# Patient Record
Sex: MALE | Race: OTHER | HISPANIC OR LATINO | URBAN - METROPOLITAN AREA
[De-identification: names, ages, dates, MRNs, and addresses within clinical notes are randomized per-mention and may not be internally consistent; named-entity substitution may affect disease eponyms.]

---

## 2023-04-03 ENCOUNTER — EMERGENCY (EMERGENCY)
Facility: HOSPITAL | Age: 63
LOS: 1 days | Discharge: HOME | End: 2023-04-03
Attending: STUDENT IN AN ORGANIZED HEALTH CARE EDUCATION/TRAINING PROGRAM | Admitting: STUDENT IN AN ORGANIZED HEALTH CARE EDUCATION/TRAINING PROGRAM
Payer: MEDICAID

## 2023-04-03 ENCOUNTER — EMERGENCY (EMERGENCY)
Facility: HOSPITAL | Age: 63
LOS: 1 days | Discharge: ANOTHER HOSPITAL | End: 2023-04-03
Attending: EMERGENCY MEDICINE | Admitting: EMERGENCY MEDICINE
Payer: MEDICAID

## 2023-04-03 VITALS
TEMPERATURE: 98 F | RESPIRATION RATE: 18 BRPM | SYSTOLIC BLOOD PRESSURE: 120 MMHG | OXYGEN SATURATION: 99 % | HEART RATE: 56 BPM | DIASTOLIC BLOOD PRESSURE: 64 MMHG

## 2023-04-03 VITALS
HEIGHT: 67 IN | OXYGEN SATURATION: 95 % | WEIGHT: 220.02 LBS | SYSTOLIC BLOOD PRESSURE: 120 MMHG | TEMPERATURE: 99 F | DIASTOLIC BLOOD PRESSURE: 79 MMHG | HEART RATE: 80 BPM | RESPIRATION RATE: 18 BRPM

## 2023-04-03 VITALS
TEMPERATURE: 98 F | DIASTOLIC BLOOD PRESSURE: 66 MMHG | HEART RATE: 97 BPM | RESPIRATION RATE: 18 BRPM | SYSTOLIC BLOOD PRESSURE: 133 MMHG | HEIGHT: 67 IN | OXYGEN SATURATION: 100 %

## 2023-04-03 LAB — SARS-COV-2 RNA SPEC QL NAA+PROBE: SIGNIFICANT CHANGE UP

## 2023-04-03 PROCEDURE — 90715 TDAP VACCINE 7 YRS/> IM: CPT

## 2023-04-03 PROCEDURE — 99285 EMERGENCY DEPT VISIT HI MDM: CPT

## 2023-04-03 PROCEDURE — U0003: CPT

## 2023-04-03 PROCEDURE — 90471 IMMUNIZATION ADMIN: CPT

## 2023-04-03 PROCEDURE — 99284 EMERGENCY DEPT VISIT MOD MDM: CPT

## 2023-04-03 PROCEDURE — 70480 CT ORBIT/EAR/FOSSA W/O DYE: CPT | Mod: 26,MA

## 2023-04-03 PROCEDURE — 99284 EMERGENCY DEPT VISIT MOD MDM: CPT | Mod: 25

## 2023-04-03 PROCEDURE — 70480 CT ORBIT/EAR/FOSSA W/O DYE: CPT | Mod: MA

## 2023-04-03 PROCEDURE — U0005: CPT

## 2023-04-03 RX ORDER — OXYCODONE AND ACETAMINOPHEN 5; 325 MG/1; MG/1
1 TABLET ORAL ONCE
Refills: 0 | Status: DISCONTINUED | OUTPATIENT
Start: 2023-04-03 | End: 2023-04-03

## 2023-04-03 RX ORDER — ACETAMINOPHEN 500 MG
650 TABLET ORAL ONCE
Refills: 0 | Status: COMPLETED | OUTPATIENT
Start: 2023-04-03 | End: 2023-04-03

## 2023-04-03 RX ORDER — TETANUS TOXOID, REDUCED DIPHTHERIA TOXOID AND ACELLULAR PERTUSSIS VACCINE, ADSORBED 5; 2.5; 8; 8; 2.5 [IU]/.5ML; [IU]/.5ML; UG/.5ML; UG/.5ML; UG/.5ML
0.5 SUSPENSION INTRAMUSCULAR ONCE
Refills: 0 | Status: COMPLETED | OUTPATIENT
Start: 2023-04-03 | End: 2023-04-03

## 2023-04-03 RX ADMIN — Medication 650 MILLIGRAM(S): at 12:38

## 2023-04-03 RX ADMIN — OXYCODONE AND ACETAMINOPHEN 1 TABLET(S): 5; 325 TABLET ORAL at 14:56

## 2023-04-03 RX ADMIN — TETANUS TOXOID, REDUCED DIPHTHERIA TOXOID AND ACELLULAR PERTUSSIS VACCINE, ADSORBED 0.5 MILLILITER(S): 5; 2.5; 8; 8; 2.5 SUSPENSION INTRAMUSCULAR at 12:37

## 2023-04-03 NOTE — CONSULT NOTE ADULT - SUBJECTIVE AND OBJECTIVE BOX
[facility] DEPARTMENT OF OPHTHALMOLOGY - INITIAL ADULT CONSULT  ------------------------------------------------------------------------------------------------------------    HPI: 62-year-old male, history of CAD hypertension hyperlipidemia on Plavix prior cataract surgery, presents as a transfer from [place] with a chief complaint of traumatic injury to the left, patient reports he was using a nail gun today when shot a nail into a 2 x 4 with a wooden bolts that thereafter shattered and wood fragments flew into his eye causing bleeding from the eye, no other injuries no other complaints, can move everything and feel everything, no vision changes no photophobia, was sent to [facility] for operative evaluation, had a CT at outside hospital that did not show any retained foreign bodies or fragments, vision there was noted to be 20/20 bilateral OD OS OL uncorrected fluorescein uptake stain was negative.  Patient reports pain is controlled.      PAST MEDICAL & SURGICAL HISTORY:  Hypertension        Past Ocular History: None  Ophthalmic Medications: None    MEDICATIONS  (STANDING):    MEDICATIONS  (PRN):    Allergies & Intolerances:   No Known Allergies    Review of Systems:  Constitutional: No fever, chills  Eyes: No blurry vision, flashes, floaters, FBS, erythema, discharge, double vision, OU  Neuro: No tremors  Cardiovascular: No chest pain, palpitations  Respiratory: No SOB, no cough  GI: No nausea, vomiting, abdominal pain  : No dysuria  Skin: no rash  Psych: no depression  Endocrine: no polyuria, polydipsia  Heme/lymph: no swelling    VITALS: T(C): 36.7 (04-03-23 @ 16:48)  T(F): 98 (04-03-23 @ 16:48), Max: 98.6 (04-03-23 @ 12:02)  HR: 97 (04-03-23 @ 16:48) (80 - 97)  BP: 133/66 (04-03-23 @ 16:48) (120/79 - 133/66)  RR:  (18 - 18)  SpO2:  (95% - 100%)  Wt(kg): --  General: AAO x 3, appropriate mood and affect    Ophthalmology Exam:  Visual acuity (sc): 20/20 OU  Pupils: PERRL OU, no APD  Ttono: 15 OD, 13 OS  Extraocular movements (EOMs): Full OU, no pain, no diplopia  Confrontational Visual Field (CVF): Full OU  Color Plates: 12/12 OU    Pen Light Exam (PLE)  External: Flat OU  Lids/Lashes/Lacrimal Ducts: Flat OU    Sclera/Conjunctiva: W+Q OD, temporal TESSY OS, Piyush negative  Cornea: Cl OU  Anterior Chamber: D+F OU    Iris: Flat OU  Lens: PCIOL OU    Fundus Exam: dilated with 1% tropicamide and 2.5% phenylephrine  Approval obtained from primary team for dilation  Patient aware that pupils can remained dilated for at least 4-6 hours  Exam performed with 20D lens    Vitreous: wnl OU  Disc, cup/disc: sharp and pink, 0.4 OU  Macula: wnl OU  Vessels: wnl OU  Periphery: wnl OU    Labs/Imaging:        ACC: [id number] EXAM:  CT ORBITS   ORDERED BY: KASSI MONTANO     PROCEDURE DATE:  04/03/2023          INTERPRETATION:  HISTORY: Left orbital trauma. Evaluate for foreign body   or globe rupture..    COMPARISON: None.    TECHNIQUE: Noncontrast axial CT images of the orbits were acquired and   submitted for interpretation. Sagittal and coronal reformations are also   provided.    FINDINGS:    No radiopaque foreign body or fracture is identified.  The globes are   intact without retrobulbar hematoma. Bilateral cataract surgery. The   extraocular muscles and optic nerves are symmetric and normal in size.    Visualized parotid glands, infratemporal fossa, and parapharyngeal fat   are within normal limits.    Scattered ethmoid sinus mucosal thickening. Remaining visualized   paranasal sinuses are clear. No fluid level.    Calcified plaque along the intracranial carotid siphons.    The nasopharyngeal contours are within normal limits.    IMPRESSION:    No orbital foreign body. Intact globes.    --- End of Report ---            KINJAL MCNEILL MD; Attending Radiologist  This document has been electronically signed. Apr  3 2023  2:37PM

## 2023-04-03 NOTE — CONSULT NOTE ADULT - ASSESSMENT
Assessment and Recommendations:  62y male w/ pmhx/ochx of CAD hypertension hyperlipidemia on Plavix prior cataract surgery consulted for OS trauma after being struck with OS with a metallic nut while using a nail gun, found to have subconjunctival hemorrhage.     1. OS trauma  - Visual acuity (sc): 20/20 OU, Pupils: PERRL OU, no APD, Ttono: 15 OD, 13 OS, Extraocular movements (EOMs): Full OU, no pain, no diplopiaConfrontational Visual Field (CVF): Full OU, Color Plates: 12/12 OU  - TESSY OS, Piyush negative, corneas clear  - CT negative for IOFB, globe rupture  - Start preservative-free artificial tears QID OS.     Outpatient follow-up: Patient should follow-up with his/her ophthalmologist or with [facility] Department of Ophthalmology at the address below     [address] [place] [address]. [address]  [place] [address]  [phone]    Matilda LIVINGSTON Rai, MD  PGY-3, Ophthalmology  [phone]    Also available on [employer] Teams.

## 2023-04-03 NOTE — ED PROVIDER NOTE - OBJECTIVE STATEMENT
Shona GARCIA: 62-year-old male, history of CAD hypertension hyperlipidemia on Plavix prior cataract surgery, presents as a transfer from [place] with a chief complaint of traumatic injury to the left, patient reports he was using a nail gun today when shot a nail into a 2 x 4 with a wooden bolts that thereafter shattered and wood fragments flew into his eye causing bleeding from the eye, no other injuries no other complaints, can move everything and feel everything, no vision changes no photophobia, was sent to [facility] for operative evaluation, had a CT at outside hospital that did not show any retained foreign bodies or fragments, vision there was noted to be 20/20 bilateral OD OS OL uncorrected fluorescein uptake stain was negative.  Patient reports pain is controlled.

## 2023-04-03 NOTE — ED ADULT NURSE NOTE - OBJECTIVE STATEMENT
Pt was at work when a piece of wood went into his left eye.  Left eye noted with bleeding. Pt was at work when a nail that he was hammering slipped and went into his left eye.  Left eye noted with bleeding.

## 2023-04-03 NOTE — ED PROVIDER NOTE - NSFOLLOWUPINSTRUCTIONS_ED_ALL_ED_FT
Thank you for visiting our Emergency Department, it has been a pleasure taking part in your healthcare.  Please read and follow all of your discharge instructions. These instructions contain important information regarding your Emergency Department visit and future medical care.     Your discharge diagnosis is: eye injury  Please take all discharge medications as indicated below:  Take Motrin/Tylenol for pain as needed, please follow instructions on manufacturers label. If you have any questions please consult a pharmacist or your PMD.  Please follow up with your Primary Care Doctor (PMD) within x48 hours.  Bring and show your PMD all documents and results you were given during your ED visit.  If you do not have a primary care doctor please call [phone] to establish primary care.  Please follow up with Opthalmology within x48 hours.  A list of providers for follow up has been given to you.  A copy of resulted labs, imaging, and findings have been provided to you.   You can also access all of your results through the [facility] how [facility] [place] [facility].  If you have questions about your results, please call the Emergency Department.  During your visit and at time of discharge, you had a detailed discussion with your provider regarding your diagnosis, care management and discharge planning.  Topics that were discussed included but were not limited to: return precautions, follow up visits with existing or new providers, new prescriptions and/or medication changes, wound and/or splint/cast care, incidental laboratory/radiology findings, or other care   aspects specific to your diagnosis and treatment. You have been given the opportunity to have your questions answered. At this time you have been deemed stable and fit for discharge.  Return precautions to the Emergency Department include but are not limited to: unrelenting nausea, vomiting, fever, chills, chest pain, shortness of breath, dizziness, chest or abdominal pain, worsening back pain, syncope, blood in urine or stool, headache that doesn't resolve, numbness or tingling, loss of sensation, loss of motor function, or any other concerning symptoms.    Please bring all ED Documents you were given during your stay to your PMD.   They contain important information for you and your PMD, including incidental lab/radiology findings that your PMD should be aware of.

## 2023-04-03 NOTE — ED PROVIDER NOTE - ATTENDING APP SHARED VISIT CONTRIBUTION OF CARE
62-year-old male with history of hypertension, CAD x3 cardiac stents, on aspirin and Plavix, states that while at work he was using a nail gun to frame a window, and after discharging the nail a piece of wood shot into his left thigh causing pain and bleeding, patient states he feels pressure around the periorbital area, vision is intact, noted subconjunctival hemorrhage of left eye, PERRL, EOMI, tetracaine drops applied to left eye with fluorescein stain, no uptake on Woods lamp, will obtain CT orbits rule out foreign body and concern for ruptured globe will call ophthalmology, will update patient's tetanus and Tylenol for pain control, patient denies nausea or vomiting.

## 2023-04-03 NOTE — ED PROVIDER NOTE - PATIENT PORTAL LINK FT
You can access the FollowMyHealth Patient Portal offered by [facility] by registering at the following website: [email or web address]. By joining [employer]’s FollowMyHealth portal, you will also be able to view your health information using other applications (apps) compatible with our system.

## 2023-04-03 NOTE — ED ADULT NURSE REASSESSMENT NOTE - NS ED NURSE REASSESS COMMENT FT1
Patient awake and resting in chair; respirations even and unlabored, no signs/symptoms of acute distress. Patient denies dyspnea, shortness of breath, and chest pain. Patient endorses 8/10 pain to left eye; patient endorses improvement in condition, MD aware. Safety measures in place and call bell within reach.

## 2023-04-03 NOTE — ED PROVIDER NOTE - PHYSICAL EXAMINATION
Detail Level: Generalized
Detail Level: Zone
Shona GARCIA:  VITALS: Initial triage and subsequent vitals have been reviewed by me.  GEN APPEARANCE: WDWN, alert and cooperative, non-toxic appearing and in NAD  HEAD: Atraumatic, normocephalic   EYES: PERRLa, EOMI, vision grossly intact. Left subconjunctival hemorrhage pupil equal round reactive to light bilaterally EOM intact  EARS: Gross hearing intact.   NOSE: No nasal discharge, no external evidence of epistaxis.   NECK: Supple  CV: RRR, S1S2, no c/r/m/g. No cyanosis. Extremities warm, well perfused. Cap refill <2 seconds. No bruits.   LUNGS: CTAB. No wheezing. No rales. No rhonchi. No diminished breath sounds.   ABDOMEN: Soft, NTND. No guarding or rebound. No masses.   MSK/EXT: Spine appears normal, no spine point tenderness. No CVA ttp. Normal muscular development. Pelvis stable. No obvious joint or bony deformity, no peripheral edema.   NEURO: Alert, follows commands. Weight bearing normal. Speech normal. Sensation and motor normal x4 extremities.   SKIN: Normal color for race, warm, dry and intact. No evidence of rash.  PSYCH: Normal mood and affect.

## 2023-04-03 NOTE — ED ADULT TRIAGE NOTE - CHIEF COMPLAINT QUOTE
Pt reporting to the ED as a Transfer from [place] for Optho evaluation, pt doing yard work with nail gun and nail went into pt's left eye, has sub conjunctival hemorrhage. No pmh.

## 2023-04-03 NOTE — ED PROVIDER NOTE - OBJECTIVE STATEMENT
63 yo M PMHx HTN, CAD on aspirin and plavix presents to ED c/o left eye trauma x just prior to arrival. Pt states he was using a nail gun overhead while doing some wooden frame work, accidentally nailed into a metal bolt causing the nail to fly into his left eye. Pt c/o pain to L eye, bleeding and some blurred vision. Last tetanus unknown. 61 yo M PMHx HTN, HLD, CAD sp stents on aspirin and plavix presents to ED c/o left eye trauma x just prior to arrival. Pt states he was using a nail gun overhead while doing some wooden frame work, accidentally nailed into a metal bolt causing the nail to fly into his left eye. Pt c/o pain to L eye, bleeding and some blurred vision. Last tetanus unknown.

## 2023-04-03 NOTE — ED ADULT NURSE NOTE - CHIEF COMPLAINT QUOTE
Pt reporting to the ED as a Transfer from [place] for Optho evaluation, pt doing yard work with nail gun and nail went into pt's left eye, has sub conjunctival hemorrhage. No pmh.

## 2023-04-03 NOTE — ED PROVIDER NOTE - PROGRESS NOTE DETAILS
Shona GARCIA: Pt assessed at beside. Pt resting comfortably, pain controlled, pt questions answered. Vital signs stable. seen and cleared by optho, pt to use artificial tears and follow up in clinic, Strict return precautions were discussed. Pt expressed understanding.

## 2023-04-03 NOTE — ED PROVIDER NOTE - CLINICAL SUMMARY MEDICAL DECISION MAKING FREE TEXT BOX
Shona GARCIA: Exam vital stable nontoxic-appearing physical exam as above, DDx noted labs and imaging from outside hospital, noted comprehensive eye there was reassuring, will discuss with ophthalmology reassess and dispo accordingly thereafter.

## 2023-04-04 PROBLEM — I10 ESSENTIAL (PRIMARY) HYPERTENSION: Chronic | Status: ACTIVE | Noted: 2023-04-03

## 2024-05-13 NOTE — ED ADULT TRIAGE NOTE - WEIGHT IN LBS
Detail Level: Zone
Continue Regimen: ciclopirox 8 % topical solution : Apply a thin layer BID to affected nails
Render In Strict Bullet Format?: No
220
